# Patient Record
Sex: FEMALE | Race: WHITE | Employment: PART TIME | URBAN - NONMETROPOLITAN AREA
[De-identification: names, ages, dates, MRNs, and addresses within clinical notes are randomized per-mention and may not be internally consistent; named-entity substitution may affect disease eponyms.]

---

## 2017-01-29 PROBLEM — Z12.11 COLON CANCER SCREENING: Status: ACTIVE | Noted: 2017-01-29

## 2018-09-26 PROBLEM — Z12.11 COLON CANCER SCREENING: Status: RESOLVED | Noted: 2017-01-29 | Resolved: 2018-09-26

## 2019-08-15 ENCOUNTER — HOSPITAL ENCOUNTER (OUTPATIENT)
Dept: WOMENS IMAGING | Age: 64
Discharge: HOME | End: 2019-08-17

## 2019-08-15 DIAGNOSIS — Z12.31 VISIT FOR SCREENING MAMMOGRAM: ICD-10-CM

## 2019-08-15 PROCEDURE — 77063 BREAST TOMOSYNTHESIS BI: CPT

## 2021-10-22 ENCOUNTER — HOSPITAL ENCOUNTER (OUTPATIENT)
Dept: WOMENS IMAGING | Age: 66
Discharge: HOME | End: 2021-10-24
Payer: MEDICARE

## 2021-10-22 DIAGNOSIS — Z78.0 POSTMENOPAUSAL: ICD-10-CM

## 2021-10-22 DIAGNOSIS — Z12.39 ENCOUNTER FOR SCREENING FOR MALIGNANT NEOPLASM OF BREAST, UNSPECIFIED SCREENING MODALITY: ICD-10-CM

## 2021-10-22 DIAGNOSIS — Z12.31 VISIT FOR SCREENING MAMMOGRAM: ICD-10-CM

## 2021-10-22 PROCEDURE — 77063 BREAST TOMOSYNTHESIS BI: CPT

## 2021-10-22 PROCEDURE — 77080 DXA BONE DENSITY AXIAL: CPT

## 2022-10-19 ENCOUNTER — TELEPHONE (OUTPATIENT)
Dept: PHARMACY | Facility: CLINIC | Age: 67
End: 2022-10-19

## 2022-10-19 NOTE — TELEPHONE ENCOUNTER
Patient called back, she said she only takes it every other day because it bothers her stomach. She heard she was suppose to take it at night and switched it recently and then had trouble remembering. Her first fill she had an upset stomach and took it every other day. The second fill she took half a tablet daily for two weeks until the Nurse called and told her she needs to take a full tablet. She said he is doing better with taking one daily now, and remembering is the tough part. I went over some techniques such as setting an alarm on her phone, placing her bottle with something she does every single night    She said she would go by dariel herself for the refill, and declined us calling for her, she also declined a 100 day supply.     Sergio Stephens  PharmD, [address] [place] [address], toll free: [phone], option 1    For [address] [place] [address] in place:  No  Recommendation Provided To: Patient/Caregiver: 1 via Telephone  Intervention Detail: Adherence Monitorin  Gap Closed?: Yes   Intervention Accepted By: Patient/Caregiver: 1  Time Spent (min): 10

## 2022-10-19 NOTE — TELEPHONE ENCOUNTER
[facility] CLINICAL PHARMACY: ADHERENCE REVIEW  Identified care gap per [employer]: fills at [facility]: Statin adherence    Last Visit: 9/7/22    Patient not found in Outcomes MTM    [address] Records claims through 10/8/22 (Prior Year Scott Mccoy =  20%; YTD Scott Mccoy = Filled only once; Potential Fail Date: 10/30/22 ):   Atorvastatin 80mg last filled on 9/7/22 for 30 day supply. Next refill due: 10/7/22    Per PCP result note with 9/15/22 lipid panel: \"She needs to go back on cholesterol medicine reorder it. \"    Per Reconciled Dispense Report: 30ds fill 8/6/21, 9/7/22    Lab Results   Component Value Date    CHOL 259 (H) 09/15/2022    TRIG 181 (H) 09/15/2022    HDL 48 09/15/2022    LDLCALC 175 (H) 09/15/2022     No results found for: ALT, AST  The 10-year ASCVD risk score (Rinku ALSTON, et al., 2019) is: 6.3%    Values used to calculate the score:      Age: 78 years      Sex: Female      Is Non- : No      Diabetic: No      Tobacco smoker: No      Systolic Blood Pressure: 493 mmHg      Is BP treated: No      HDL Cholesterol: 48 mg/dL      Total Cholesterol: 259 mg/dL     PLAN  The following are interventions that have been identified:   - Need to confirm if patient is taking atorvastatin 80mg daily now? (Was to resume after Sept labwork)   Due for refill - has refills or eligible for 100-day supply    Attempting to reach patient to review. Left message asking for return call. and [employer] message sent to patient.     Future Appointments   Date Time Provider Department Center   9/11/2023  8:40 AM MD Nestor Radford, PharmD, [facility], toll free: [phone], option 1

## 2022-11-28 ENCOUNTER — TELEPHONE (OUTPATIENT)
Dept: PHARMACY | Facility: CLINIC | Age: 67
End: 2022-11-28

## 2022-11-28 NOTE — TELEPHONE ENCOUNTER
[facility]IN[facility]: ADHERENCE REVIEW  Identified care gap per [employer]: fills at [facility]: Statin adherence    Last Visit: 9/7/22    Patient not found in Outcomes [facility]    [address] Records claims through 11/20/22 (YTD Scott Harmanra =  77%; Potential Fail Date: 11/29/22 ):   Atorvastatin 80mg last filled on 10/24/22 for 30 day supply. Next refill due: 11/23/22    Per 10/19/22 encounter/outreach: patient declined 90 or 100-ds; patient was working on reminders to take daily    Lab Results   Component Value Date    CHOL 259 (H) 09/15/2022    TRIG 181 (H) 09/15/2022    HDL 48 09/15/2022    LDLCALC 175 (H) 09/15/2022     No results found for: ALT, AST  The 10-year ASCVD risk score (Rinku ALSTON, et al., 2019) is: 6.3%    Values used to calculate the score:      Age: 78 years      Sex: Female      Is Non- : No      Diabetic: No      Tobacco smoker: No      Systolic Blood Pressure: 734 mmHg      Is BP treated: No      HDL Cholesterol: 48 mg/dL      Total Cholesterol: 259 mg/dL     PLAN  The following are interventions that have been identified:   - Patient overdue refilling atorvastatin 80mg daily  Refill was due @11/23 (77% PDC YTD)  - Patient eligible for 90 or 100 day supply - declined in Oct outreach    Reached patient to review. States she has bottle with her today to drop off at [facility] for refill. Declined my offer to outreach to Sherrie to refill for her. Was at work, so not able to speak long - did attempt to briefly review options for phone ordering of refill and/or automatic refill options. Patient states she will ask at [facility] today. Did not inquire again about 90 or 100-ds at this time.     Future Appointments   Date Time Provider Department Center   9/11/2023  8:40 AM Lavanda Luke, MD [facility] Begin Buster Meigs Just, PharmD, [facility]  [facility], toll free: [phone], option 1 =======================================================   For Pharmacy [address] in place:  No  Recommendation Provided To: Patient/Caregiver: 1 via Telephone  Intervention Detail: Adherence Monitorin  Gap Closed?: Yes   Intervention Accepted By: Patient/Caregiver: 1  Time Spent (min): 15

## 2023-02-22 ENCOUNTER — TELEPHONE (OUTPATIENT)
Dept: PHARMACY | Facility: CLINIC | Age: 68
End: 2023-02-22

## 2023-02-22 NOTE — TELEPHONE ENCOUNTER
[facility] CLINICAL PHARMACY: ADHERENCE REVIEW  Identified care gap per [employer]: fills at [facility]: Statin adherence      ASSESSMENT    [id number] Records claims through 23 (Prior Year [place] = not reported; YTD Scott Mccoy = FIRST FILL; Potential Fail Date: 23):   ATORVASTATIN TAB 80MG last filled on 23 for 30 day supply. Next refill due: 2/10/23    Prescribed si tablet/capsule daily    Per Insurer Portal: same as insurance records      Lab Results   Component Value Date    CHOL 259 (H) 09/15/2022    TRIG 181 (H) 09/15/2022    HDL 48 09/15/2022    LDLCALC 175 (H) 09/15/2022     No results found for: ALT, AST  The 10-year ASCVD risk score (Rinku ALSTON, et al., 2019) is: 6.3%    Values used to calculate the score:      Age: 78 years      Sex: Female      Is Non- : No      Diabetic: No      Tobacco smoker: No      Systolic Blood Pressure: 359 mmHg      Is BP treated: No      HDL Cholesterol: 48 mg/dL      Total Cholesterol: 259 mg/dL     PLAN  The following are interventions that have been identified:   - Patient eligible for 100 day supply of atorvastatin      LVM for patient in regards to switching from 30 to 100ds  Will send letter    For Pharmacy Admin Tracking Only    Program: [facility] in place:  No  Gap Closed?: No   Time Spent (min): 15        Last Visit:   Recent Visits  Date Type Provider Dept   22 Office Visit Soumya Marks MD [address] recent visits within past 540 days with a meds authorizing provider and meeting all other requirements  Future Appointments  No visits were found meeting these conditions. Showing future appointments within next 150 days with a meds authorizing provider and meeting all other requirements    Next Visit:  Future Appointments   Date Time Provider Brittni Serrano   2023  8:40 AM Soumya Marks MD [address]   Population [facility] Clinical   [address] [place] [address] free: [phone]

## 2024-09-13 PROBLEM — E78.2 MIXED HYPERLIPIDEMIA: Status: ACTIVE | Noted: 2024-09-13

## 2024-11-01 ENCOUNTER — HOSPITAL ENCOUNTER (OUTPATIENT)
Age: 69
Discharge: HOME | End: 2024-11-03
Payer: MEDICARE

## 2024-11-01 ENCOUNTER — HOSPITAL ENCOUNTER (OUTPATIENT)
Dept: GENERAL RADIOLOGY | Age: 69
Discharge: HOME | End: 2024-11-03
Payer: MEDICARE

## 2024-11-01 DIAGNOSIS — M79.642 PAIN OF LEFT HAND: ICD-10-CM

## 2024-11-01 DIAGNOSIS — M79.641 PAIN OF RIGHT HAND: ICD-10-CM

## 2024-11-01 PROCEDURE — 73130 X-RAY EXAM OF HAND: CPT

## 2024-11-07 ENCOUNTER — HOSPITAL ENCOUNTER (OUTPATIENT)
Dept: WOMENS IMAGING | Age: 69
Discharge: HOME | End: 2024-11-09
Payer: MEDICARE

## 2024-11-07 DIAGNOSIS — Z12.31 VISIT FOR SCREENING MAMMOGRAM: ICD-10-CM

## 2024-11-07 PROCEDURE — 77063 BREAST TOMOSYNTHESIS BI: CPT
